# Patient Record
Sex: FEMALE | Employment: UNEMPLOYED
[De-identification: names, ages, dates, MRNs, and addresses within clinical notes are randomized per-mention and may not be internally consistent; named-entity substitution may affect disease eponyms.]

---

## 2017-12-17 ENCOUNTER — HEALTH MAINTENANCE LETTER (OUTPATIENT)
Age: 27
End: 2017-12-17

## 2021-09-11 ENCOUNTER — OFFICE VISIT (OUTPATIENT)
Dept: URGENT CARE | Facility: URGENT CARE | Age: 31
End: 2021-09-11
Payer: COMMERCIAL

## 2021-09-11 VITALS
OXYGEN SATURATION: 97 % | DIASTOLIC BLOOD PRESSURE: 64 MMHG | SYSTOLIC BLOOD PRESSURE: 102 MMHG | HEART RATE: 88 BPM | TEMPERATURE: 98.4 F

## 2021-09-11 DIAGNOSIS — H10.9 BACTERIAL CONJUNCTIVITIS OF RIGHT EYE: Primary | ICD-10-CM

## 2021-09-11 DIAGNOSIS — J30.2 SEASONAL ALLERGIC RHINITIS, UNSPECIFIED TRIGGER: ICD-10-CM

## 2021-09-11 PROCEDURE — 99203 OFFICE O/P NEW LOW 30 MIN: CPT | Performed by: PHYSICIAN ASSISTANT

## 2021-09-11 RX ORDER — POLYMYXIN B SULFATE AND TRIMETHOPRIM 1; 10000 MG/ML; [USP'U]/ML
1-2 SOLUTION OPHTHALMIC EVERY 4 HOURS
Qty: 3 ML | Refills: 0 | Status: SHIPPED | OUTPATIENT
Start: 2021-09-11 | End: 2021-09-16

## 2021-09-11 NOTE — PROGRESS NOTES
Assessment & Plan     Bacterial conjunctivitis of right eye    - trimethoprim-polymyxin b (POLYTRIM) 56449-2.1 UNIT/ML-% ophthalmic solution  Dispense: 3 mL; Refill: 0  See patient instructions    Seasonal allergic rhinitis, unspecified trigger  Continue zyrtec at night. We discussed symptoms of bacterial sinusitis and when antibiotics would be necessary. They are not warranted at this time. Diagnosis and treatment plan were discussed with patient and/or parent. If symptoms worsen or do not improve in the next few days, follow-up with your primary care provider or visit an [facility] urgent care clinic location.  Patient verbalizes understanding of all things discussed. All questions were addressed and answered.     Return in about 1 week (around 9/18/2021) for If not better, sooner if worsening.    Olivia Richardson PA-C  [facility] MARII Estes is a 30 year old female who presents to clinic today for the following health issues:  Chief Complaint   Patient presents with     Urgent Care     Eye Problem     Crusty and red this morning. Very itchy. Pt applied warm compress and eye drops.      Nasal Congestion     pt concern about sinus infection. Took OTC sudafed and claritin and still very little improvement     HPI    Few days of itching on the right eye. Woke up with her right eye crusted shut. Eye feels itchy. Discharge is yellow/clear.     Has also been having allergy symptoms for the last 3 weeks. Has been taking sudafed during the day, allegra and zyrtec as well. Also has sore throat from post nasal drip. Waking up to blow nose.     Does not wear contacts. No eye pain. No injury to the eye. Right eye vision is blurry.     Review of Systems  Constitutional, HEENT, cardiovascular, pulmonary, gi and gu systems are negative, except as otherwise noted.      Objective    /64   Pulse 88   Temp 98.4  F (36.9  C) (Tympanic)   LMP 08/19/2021   SpO2 97%   Physical  Exam   GENERAL: healthy, alert and no distress  EYES: Eyes grossly normal to inspection, PERRL, EOMI, visual fields normal, eyelids- slightly erythematous and conjunctiva/corneas- conjunctival injection OD and yellow colored discharge present right  HENT: ear canals and TM's normal, nose and mouth without ulcers or lesions  NECK: no adenopathy, no asymmetry, masses, or scars and thyroid normal to palpation  RESP: lungs clear to auscultation - no rales, rhonchi or wheezes  CV: regular rate and rhythm, normal S1 S2, no S3 or S4, no murmur, click or rub, no peripheral edema and peripheral pulses strong  ABDOMEN: soft, nontender, no hepatosplenomegaly, no masses and bowel sounds normal  MS: no gross musculoskeletal defects noted, no edema

## 2024-09-11 ENCOUNTER — HOSPITAL ENCOUNTER (OUTPATIENT)
Dept: MAMMOGRAPHY | Facility: CLINIC | Age: 34
Discharge: HOME | End: 2024-09-11
Attending: PHYSICIAN ASSISTANT
Payer: COMMERCIAL

## 2024-09-11 DIAGNOSIS — N64.4 BREAST PAIN, RIGHT: ICD-10-CM

## 2024-09-11 PROCEDURE — 77062 BREAST TOMOSYNTHESIS BI: CPT
